# Patient Record
Sex: MALE | Race: BLACK OR AFRICAN AMERICAN | NOT HISPANIC OR LATINO | URBAN - METROPOLITAN AREA
[De-identification: names, ages, dates, MRNs, and addresses within clinical notes are randomized per-mention and may not be internally consistent; named-entity substitution may affect disease eponyms.]

---

## 2018-01-24 ENCOUNTER — EMERGENCY (EMERGENCY)
Facility: HOSPITAL | Age: 28
LOS: 1 days | Discharge: HOME | End: 2018-01-24
Attending: EMERGENCY MEDICINE
Payer: COMMERCIAL

## 2018-01-24 VITALS
WEIGHT: 179.9 LBS | DIASTOLIC BLOOD PRESSURE: 92 MMHG | HEART RATE: 86 BPM | RESPIRATION RATE: 16 BRPM | SYSTOLIC BLOOD PRESSURE: 158 MMHG | TEMPERATURE: 98 F | HEIGHT: 74 IN | OXYGEN SATURATION: 100 %

## 2018-01-24 VITALS
RESPIRATION RATE: 16 BRPM | DIASTOLIC BLOOD PRESSURE: 77 MMHG | OXYGEN SATURATION: 100 % | SYSTOLIC BLOOD PRESSURE: 134 MMHG | HEART RATE: 62 BPM

## 2018-01-24 PROCEDURE — 99282 EMERGENCY DEPT VISIT SF MDM: CPT

## 2018-01-24 PROCEDURE — 99284 EMERGENCY DEPT VISIT MOD MDM: CPT

## 2018-01-24 NOTE — ED ADULT TRIAGE NOTE - CHIEF COMPLAINT QUOTE
pt employee lower right groin pain back pain when lifting first felt in October but intermittent but know discomfort consistent x 3 days normal bowel and bladder verbalized

## 2018-01-24 NOTE — ED PROVIDER NOTE - ATTENDING CONTRIBUTION TO CARE
Attending MD Brock:   I personally have seen and examined this patient.  Physician assistant note reviewed and agree on plan of care and except where noted.  See HPI, PE, and MDM for details.

## 2018-01-24 NOTE — ED PROVIDER NOTE - PHYSICAL EXAMINATION
Attending MD Brock: A & O x 3, NAD, EOMI b/l, PERRL b/l; lungs CTAB, heart with reg rhythm without murmur; abdomen soft NTND; extremities with no edema; affect appropriate. neuro exam non focal with no motor or sensory deficits. full AROM b/l hips, no focal bony ttp, no bulges appreciated in right inguinal area, testis nontender b/l

## 2018-01-24 NOTE — ED PROVIDER NOTE - OBJECTIVE STATEMENT
26 yo male with no PMHx p/w right lower groin pain.  The patient reports that he first noticed right groin pain 3-4 months ago.  He endorses that he lifts a lot of trays at work and the pain only occurs while lifting heavy objects.  Since his symptoms started he has had intermittent episodes, but this week he noticed more frequent episodes so he decided to come to the ER.  Patient currently pain free.  Denies symptoms at rest, fevers/chills, CP, SOB, abd pain, NVDC, dysuria, hematuria, testicular pain.  Patient also c/o low back pain after work that usually improves with rest.  No difficulty ambulating.  Denies fecal/urinary incontinence

## 2018-01-24 NOTE — ED ADULT NURSE NOTE - OBJECTIVE STATEMENT
26 yo with no PMH presents to ED with right lower groin pain. 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 This week pain has been more frequent  this week so he decided to come to ED. No pain at rest.

## 2018-01-24 NOTE — ED PROVIDER NOTE - MEDICAL DECISION MAKING DETAILS
Attending MD Brock: 27M with right groin pain x weeks with lifting, ddx includes small inguinal hernia vs SI joint pain. NSAIDs, outpatient sports med and gen surg f/u

## 2018-02-01 PROBLEM — Z00.00 ENCOUNTER FOR PREVENTIVE HEALTH EXAMINATION: Status: ACTIVE | Noted: 2018-02-01

## 2018-02-02 ENCOUNTER — APPOINTMENT (OUTPATIENT)
Dept: ORTHOPEDIC SURGERY | Facility: CLINIC | Age: 28
End: 2018-02-02
Payer: OTHER MISCELLANEOUS

## 2018-02-02 VITALS — HEIGHT: 74 IN | BODY MASS INDEX: 23.1 KG/M2 | WEIGHT: 180 LBS

## 2018-02-02 VITALS — SYSTOLIC BLOOD PRESSURE: 129 MMHG | HEART RATE: 52 BPM | DIASTOLIC BLOOD PRESSURE: 74 MMHG

## 2018-02-02 VITALS — WEIGHT: 180 LBS | BODY MASS INDEX: 23.1 KG/M2 | HEIGHT: 74 IN

## 2018-02-02 PROCEDURE — 72100 X-RAY EXAM L-S SPINE 2/3 VWS: CPT

## 2018-02-02 PROCEDURE — 99204 OFFICE O/P NEW MOD 45 MIN: CPT

## 2018-02-20 ENCOUNTER — APPOINTMENT (OUTPATIENT)
Dept: SURGERY | Facility: CLINIC | Age: 28
End: 2018-02-20
Payer: OTHER MISCELLANEOUS

## 2018-02-20 ENCOUNTER — TRANSCRIPTION ENCOUNTER (OUTPATIENT)
Age: 28
End: 2018-02-20

## 2018-02-20 VITALS
BODY MASS INDEX: 23.1 KG/M2 | OXYGEN SATURATION: 97 % | HEART RATE: 65 BPM | DIASTOLIC BLOOD PRESSURE: 81 MMHG | RESPIRATION RATE: 16 BRPM | SYSTOLIC BLOOD PRESSURE: 132 MMHG | WEIGHT: 180 LBS | TEMPERATURE: 97.9 F | HEIGHT: 74 IN

## 2018-02-20 DIAGNOSIS — Z78.9 OTHER SPECIFIED HEALTH STATUS: ICD-10-CM

## 2018-02-20 DIAGNOSIS — Z82.49 FAMILY HISTORY OF ISCHEMIC HEART DISEASE AND OTHER DISEASES OF THE CIRCULATORY SYSTEM: ICD-10-CM

## 2018-02-20 DIAGNOSIS — Z80.9 FAMILY HISTORY OF MALIGNANT NEOPLASM, UNSPECIFIED: ICD-10-CM

## 2018-02-20 PROCEDURE — 99203 OFFICE O/P NEW LOW 30 MIN: CPT

## 2018-02-23 ENCOUNTER — APPOINTMENT (OUTPATIENT)
Dept: ORTHOPEDIC SURGERY | Facility: CLINIC | Age: 28
End: 2018-02-23
Payer: OTHER MISCELLANEOUS

## 2018-02-23 VITALS
WEIGHT: 180 LBS | BODY MASS INDEX: 23.1 KG/M2 | HEIGHT: 74 IN | DIASTOLIC BLOOD PRESSURE: 83 MMHG | SYSTOLIC BLOOD PRESSURE: 137 MMHG | HEART RATE: 81 BPM

## 2018-02-23 DIAGNOSIS — Q76.3 CONGENITAL SCOLIOSIS DUE TO CONGENITAL BONY MALFORMATION: ICD-10-CM

## 2018-02-23 DIAGNOSIS — M54.5 LOW BACK PAIN: ICD-10-CM

## 2018-02-23 DIAGNOSIS — G89.29 LOW BACK PAIN: ICD-10-CM

## 2018-02-23 PROCEDURE — 99214 OFFICE O/P EST MOD 30 MIN: CPT

## 2018-02-25 ENCOUNTER — FORM ENCOUNTER (OUTPATIENT)
Age: 28
End: 2018-02-25

## 2018-02-26 ENCOUNTER — APPOINTMENT (OUTPATIENT)
Dept: ULTRASOUND IMAGING | Facility: IMAGING CENTER | Age: 28
End: 2018-02-26
Payer: OTHER MISCELLANEOUS

## 2018-02-26 ENCOUNTER — OUTPATIENT (OUTPATIENT)
Dept: OUTPATIENT SERVICES | Facility: HOSPITAL | Age: 28
LOS: 1 days | End: 2018-02-26
Payer: COMMERCIAL

## 2018-02-26 DIAGNOSIS — R10.31 RIGHT LOWER QUADRANT PAIN: ICD-10-CM

## 2018-02-26 PROCEDURE — 76857 US EXAM PELVIC LIMITED: CPT

## 2018-02-26 PROCEDURE — 76857 US EXAM PELVIC LIMITED: CPT | Mod: 26

## 2018-03-23 ENCOUNTER — OUTPATIENT (OUTPATIENT)
Dept: OUTPATIENT SERVICES | Facility: HOSPITAL | Age: 28
LOS: 1 days | End: 2018-03-23
Payer: COMMERCIAL

## 2018-03-23 ENCOUNTER — OTHER (OUTPATIENT)
Age: 28
End: 2018-03-23

## 2018-03-23 VITALS
RESPIRATION RATE: 18 BRPM | DIASTOLIC BLOOD PRESSURE: 82 MMHG | HEART RATE: 69 BPM | HEIGHT: 74.75 IN | TEMPERATURE: 98 F | SYSTOLIC BLOOD PRESSURE: 134 MMHG | WEIGHT: 184.09 LBS | OXYGEN SATURATION: 100 %

## 2018-03-23 DIAGNOSIS — Z01.818 ENCOUNTER FOR OTHER PREPROCEDURAL EXAMINATION: ICD-10-CM

## 2018-03-23 DIAGNOSIS — Z98.890 OTHER SPECIFIED POSTPROCEDURAL STATES: Chronic | ICD-10-CM

## 2018-03-23 DIAGNOSIS — K40.90 UNILATERAL INGUINAL HERNIA, WITHOUT OBSTRUCTION OR GANGRENE, NOT SPECIFIED AS RECURRENT: ICD-10-CM

## 2018-03-23 DIAGNOSIS — K40.20 BILATERAL INGUINAL HERNIA, WITHOUT OBSTRUCTION OR GANGRENE, NOT SPECIFIED AS RECURRENT: ICD-10-CM

## 2018-03-23 PROCEDURE — G0463: CPT

## 2018-03-23 RX ORDER — LIDOCAINE HCL 20 MG/ML
0.2 VIAL (ML) INJECTION ONCE
Qty: 0 | Refills: 0 | Status: DISCONTINUED | OUTPATIENT
Start: 2018-04-03 | End: 2018-04-18

## 2018-03-23 RX ORDER — SODIUM CHLORIDE 9 MG/ML
3 INJECTION INTRAMUSCULAR; INTRAVENOUS; SUBCUTANEOUS EVERY 8 HOURS
Qty: 0 | Refills: 0 | Status: DISCONTINUED | OUTPATIENT
Start: 2018-04-03 | End: 2018-04-18

## 2018-03-23 NOTE — H&P PST ADULT - NSANTHOSAYNRD_GEN_A_CORE
No. FRIDA screening performed.  STOP BANG Legend: 0-2 = LOW Risk; 3-4 = INTERMEDIATE Risk; 5-8 = HIGH Risk

## 2018-03-23 NOTE — H&P PST ADULT - HISTORY OF PRESENT ILLNESS
28 y/o M PMH bilateral inguinal hernias since December, went to the ED in January with sharp right groin pain.  Presents today for laparoscopic bilateral inguinal hernia repair.

## 2018-03-26 RX ORDER — CEFAZOLIN SODIUM 1 G
2000 VIAL (EA) INJECTION ONCE
Qty: 0 | Refills: 0 | Status: DISCONTINUED | OUTPATIENT
Start: 2018-04-03 | End: 2018-04-18

## 2018-03-28 ENCOUNTER — OTHER (OUTPATIENT)
Age: 28
End: 2018-03-28

## 2018-04-02 ENCOUNTER — TRANSCRIPTION ENCOUNTER (OUTPATIENT)
Age: 28
End: 2018-04-02

## 2018-04-02 RX ORDER — OXYCODONE HYDROCHLORIDE 5 MG/1
5 TABLET ORAL ONCE
Qty: 0 | Refills: 0 | Status: DISCONTINUED | OUTPATIENT
Start: 2018-04-03 | End: 2018-04-03

## 2018-04-02 RX ORDER — HYDROMORPHONE HYDROCHLORIDE 2 MG/ML
0.25 INJECTION INTRAMUSCULAR; INTRAVENOUS; SUBCUTANEOUS
Qty: 0 | Refills: 0 | Status: DISCONTINUED | OUTPATIENT
Start: 2018-04-03 | End: 2018-04-03

## 2018-04-02 RX ORDER — ONDANSETRON 8 MG/1
4 TABLET, FILM COATED ORAL ONCE
Qty: 0 | Refills: 0 | Status: DISCONTINUED | OUTPATIENT
Start: 2018-04-03 | End: 2018-04-18

## 2018-04-02 RX ORDER — SODIUM CHLORIDE 9 MG/ML
1000 INJECTION, SOLUTION INTRAVENOUS
Qty: 0 | Refills: 0 | Status: DISCONTINUED | OUTPATIENT
Start: 2018-04-03 | End: 2018-04-18

## 2018-04-03 ENCOUNTER — APPOINTMENT (OUTPATIENT)
Dept: SURGERY | Facility: HOSPITAL | Age: 28
End: 2018-04-03
Payer: OTHER MISCELLANEOUS

## 2018-04-03 ENCOUNTER — OUTPATIENT (OUTPATIENT)
Dept: OUTPATIENT SERVICES | Facility: HOSPITAL | Age: 28
LOS: 1 days | End: 2018-04-03
Payer: COMMERCIAL

## 2018-04-03 VITALS
TEMPERATURE: 99 F | SYSTOLIC BLOOD PRESSURE: 140 MMHG | RESPIRATION RATE: 15 BRPM | HEART RATE: 80 BPM | DIASTOLIC BLOOD PRESSURE: 80 MMHG | OXYGEN SATURATION: 99 %

## 2018-04-03 VITALS
SYSTOLIC BLOOD PRESSURE: 156 MMHG | RESPIRATION RATE: 16 BRPM | HEART RATE: 93 BPM | TEMPERATURE: 98 F | HEIGHT: 74.75 IN | OXYGEN SATURATION: 97 % | DIASTOLIC BLOOD PRESSURE: 78 MMHG | WEIGHT: 184.09 LBS

## 2018-04-03 DIAGNOSIS — Z98.890 OTHER SPECIFIED POSTPROCEDURAL STATES: Chronic | ICD-10-CM

## 2018-04-03 DIAGNOSIS — K40.20 BILATERAL INGUINAL HERNIA, WITHOUT OBSTRUCTION OR GANGRENE, NOT SPECIFIED AS RECURRENT: ICD-10-CM

## 2018-04-03 DIAGNOSIS — Z01.818 ENCOUNTER FOR OTHER PREPROCEDURAL EXAMINATION: ICD-10-CM

## 2018-04-03 PROCEDURE — C1781: CPT

## 2018-04-03 PROCEDURE — 49650 LAP ING HERNIA REPAIR INIT: CPT | Mod: 82,50

## 2018-04-03 PROCEDURE — 49650 LAP ING HERNIA REPAIR INIT: CPT | Mod: 50

## 2018-04-03 RX ORDER — CELECOXIB 200 MG/1
200 CAPSULE ORAL ONCE
Qty: 0 | Refills: 0 | Status: COMPLETED | OUTPATIENT
Start: 2018-04-03 | End: 2018-04-03

## 2018-04-03 RX ORDER — FAMOTIDINE 10 MG/ML
0 INJECTION INTRAVENOUS
Qty: 0 | Refills: 0 | COMMUNITY

## 2018-04-03 RX ORDER — ACETAMINOPHEN 500 MG
1000 TABLET ORAL ONCE
Qty: 0 | Refills: 0 | Status: COMPLETED | OUTPATIENT
Start: 2018-04-03 | End: 2018-04-03

## 2018-04-03 RX ADMIN — CELECOXIB 200 MILLIGRAM(S): 200 CAPSULE ORAL at 08:55

## 2018-04-03 RX ADMIN — Medication 1000 MILLIGRAM(S): at 08:55

## 2018-04-03 RX ADMIN — CELECOXIB 200 MILLIGRAM(S): 200 CAPSULE ORAL at 13:45

## 2018-04-03 NOTE — ASU DISCHARGE PLAN (ADULT/PEDIATRIC). - NOTIFY
Bleeding that does not stop/Increased Irritability or Sluggishness/Swelling that continues/Fever greater than 101/Inability to Tolerate Liquids or Foods/Pain not relieved by Medications/Unable to Urinate/Persistent Nausea and Vomiting

## 2018-04-03 NOTE — BRIEF OPERATIVE NOTE - PROCEDURE
<<-----Click on this checkbox to enter Procedure Laparoscopic bilateral repair of inguinal hernia, graft or prosthesis  04/03/2018    Active  MIKE

## 2018-04-03 NOTE — BRIEF OPERATIVE NOTE - PRE-OP DX
Non-recurrent bilateral inguinal hernia without obstruction or gangrene  04/03/2018    Active  Ishmael Hernandes

## 2018-04-03 NOTE — ASU DISCHARGE PLAN (ADULT/PEDIATRIC). - MEDICATION SUMMARY - MEDICATIONS TO TAKE
I will START or STAY ON the medications listed below when I get home from the hospital:    no current home medications  -- Indication: For Home med    Pepcid 20 mg oral tablet  -- as per ASU protocol  -- Indication: For Home med

## 2018-04-16 PROBLEM — Z98.890 HISTORY OF BILATERAL INGUINAL HERNIAS: Status: RESOLVED | Noted: 2018-04-03 | Resolved: 2018-04-16

## 2018-04-16 PROBLEM — K40.20 NON-RECURRENT BILATERAL INGUINAL HERNIA WITHOUT OBSTRUCTION OR GANGRENE: Status: RESOLVED | Noted: 2018-03-05 | Resolved: 2018-04-16

## 2018-04-17 ENCOUNTER — APPOINTMENT (OUTPATIENT)
Dept: SURGERY | Facility: CLINIC | Age: 28
End: 2018-04-17
Payer: OTHER MISCELLANEOUS

## 2018-04-17 VITALS
DIASTOLIC BLOOD PRESSURE: 73 MMHG | TEMPERATURE: 97.9 F | HEART RATE: 51 BPM | RESPIRATION RATE: 17 BRPM | SYSTOLIC BLOOD PRESSURE: 176 MMHG | OXYGEN SATURATION: 99 %

## 2018-04-17 DIAGNOSIS — K40.20 BILATERAL INGUINAL HERNIA, W/OUT OBSTRUCTION OR GANGRENE, NOT SPECIFIED AS RECURRENT: ICD-10-CM

## 2018-04-17 DIAGNOSIS — Z98.890 OTHER SPECIFIED POSTPROCEDURAL STATES: ICD-10-CM

## 2018-04-17 PROCEDURE — 99024 POSTOP FOLLOW-UP VISIT: CPT

## 2018-04-17 RX ORDER — IBUPROFEN 800 MG/1
800 TABLET, FILM COATED ORAL
Qty: 90 | Refills: 0 | Status: DISCONTINUED | COMMUNITY
Start: 2018-02-02 | End: 2018-04-17

## 2018-04-17 RX ORDER — OXYCODONE AND ACETAMINOPHEN 5; 325 MG/1; MG/1
5-325 TABLET ORAL
Qty: 24 | Refills: 0 | Status: DISCONTINUED | COMMUNITY
Start: 2018-04-03 | End: 2018-04-17

## 2018-05-08 ENCOUNTER — APPOINTMENT (OUTPATIENT)
Dept: SURGERY | Facility: CLINIC | Age: 28
End: 2018-05-08
Payer: OTHER MISCELLANEOUS

## 2018-05-08 VITALS
DIASTOLIC BLOOD PRESSURE: 95 MMHG | TEMPERATURE: 98.1 F | RESPIRATION RATE: 16 BRPM | WEIGHT: 180 LBS | OXYGEN SATURATION: 99 % | BODY MASS INDEX: 23.1 KG/M2 | HEIGHT: 74 IN | SYSTOLIC BLOOD PRESSURE: 148 MMHG | HEART RATE: 79 BPM

## 2018-05-08 PROCEDURE — 99024 POSTOP FOLLOW-UP VISIT: CPT

## 2018-05-16 ENCOUNTER — EMERGENCY (EMERGENCY)
Facility: HOSPITAL | Age: 28
LOS: 1 days | Discharge: HOME | End: 2018-05-16
Attending: PERSONAL EMERGENCY RESPONSE ATTENDANT | Admitting: PERSONAL EMERGENCY RESPONSE ATTENDANT
Payer: COMMERCIAL

## 2018-05-16 VITALS
SYSTOLIC BLOOD PRESSURE: 148 MMHG | RESPIRATION RATE: 16 BRPM | WEIGHT: 169.98 LBS | HEART RATE: 60 BPM | OXYGEN SATURATION: 98 % | TEMPERATURE: 98 F | DIASTOLIC BLOOD PRESSURE: 77 MMHG

## 2018-05-16 VITALS
HEART RATE: 65 BPM | DIASTOLIC BLOOD PRESSURE: 89 MMHG | SYSTOLIC BLOOD PRESSURE: 125 MMHG | TEMPERATURE: 98 F | OXYGEN SATURATION: 100 % | RESPIRATION RATE: 16 BRPM

## 2018-05-16 DIAGNOSIS — Z98.890 OTHER SPECIFIED POSTPROCEDURAL STATES: Chronic | ICD-10-CM

## 2018-05-16 LAB
ALBUMIN SERPL ELPH-MCNC: 4.7 G/DL — SIGNIFICANT CHANGE UP (ref 3.3–5)
ALP SERPL-CCNC: 48 U/L — SIGNIFICANT CHANGE UP (ref 40–120)
ALT FLD-CCNC: 15 U/L — SIGNIFICANT CHANGE UP (ref 10–45)
ANION GAP SERPL CALC-SCNC: 14 MMOL/L — SIGNIFICANT CHANGE UP (ref 5–17)
AST SERPL-CCNC: 19 U/L — SIGNIFICANT CHANGE UP (ref 10–40)
BASOPHILS # BLD AUTO: 0 K/UL — SIGNIFICANT CHANGE UP (ref 0–0.2)
BASOPHILS NFR BLD AUTO: 0.8 % — SIGNIFICANT CHANGE UP (ref 0–2)
BILIRUB SERPL-MCNC: 0.9 MG/DL — SIGNIFICANT CHANGE UP (ref 0.2–1.2)
BUN SERPL-MCNC: 12 MG/DL — SIGNIFICANT CHANGE UP (ref 7–23)
CALCIUM SERPL-MCNC: 9.9 MG/DL — SIGNIFICANT CHANGE UP (ref 8.4–10.5)
CHLORIDE SERPL-SCNC: 101 MMOL/L — SIGNIFICANT CHANGE UP (ref 96–108)
CO2 SERPL-SCNC: 27 MMOL/L — SIGNIFICANT CHANGE UP (ref 22–31)
CREAT SERPL-MCNC: 1.07 MG/DL — SIGNIFICANT CHANGE UP (ref 0.5–1.3)
EOSINOPHIL # BLD AUTO: 0.2 K/UL — SIGNIFICANT CHANGE UP (ref 0–0.5)
EOSINOPHIL NFR BLD AUTO: 8.3 % — HIGH (ref 0–6)
GLUCOSE SERPL-MCNC: 84 MG/DL — SIGNIFICANT CHANGE UP (ref 70–99)
HCT VFR BLD CALC: 44.5 % — SIGNIFICANT CHANGE UP (ref 39–50)
HGB BLD-MCNC: 14.8 G/DL — SIGNIFICANT CHANGE UP (ref 13–17)
LYMPHOCYTES # BLD AUTO: 1.1 K/UL — SIGNIFICANT CHANGE UP (ref 1–3.3)
LYMPHOCYTES # BLD AUTO: 38.2 % — SIGNIFICANT CHANGE UP (ref 13–44)
MCHC RBC-ENTMCNC: 31.1 PG — SIGNIFICANT CHANGE UP (ref 27–34)
MCHC RBC-ENTMCNC: 33.3 GM/DL — SIGNIFICANT CHANGE UP (ref 32–36)
MCV RBC AUTO: 93.4 FL — SIGNIFICANT CHANGE UP (ref 80–100)
MONOCYTES # BLD AUTO: 0.3 K/UL — SIGNIFICANT CHANGE UP (ref 0–0.9)
MONOCYTES NFR BLD AUTO: 10.2 % — SIGNIFICANT CHANGE UP (ref 2–14)
NEUTROPHILS # BLD AUTO: 1.3 K/UL — LOW (ref 1.8–7.4)
NEUTROPHILS NFR BLD AUTO: 42.5 % — LOW (ref 43–77)
PLATELET # BLD AUTO: 159 K/UL — SIGNIFICANT CHANGE UP (ref 150–400)
POTASSIUM SERPL-MCNC: 4.1 MMOL/L — SIGNIFICANT CHANGE UP (ref 3.5–5.3)
POTASSIUM SERPL-SCNC: 4.1 MMOL/L — SIGNIFICANT CHANGE UP (ref 3.5–5.3)
PROT SERPL-MCNC: 7.5 G/DL — SIGNIFICANT CHANGE UP (ref 6–8.3)
RBC # BLD: 4.76 M/UL — SIGNIFICANT CHANGE UP (ref 4.2–5.8)
RBC # FLD: 11.7 % — SIGNIFICANT CHANGE UP (ref 10.3–14.5)
SODIUM SERPL-SCNC: 142 MMOL/L — SIGNIFICANT CHANGE UP (ref 135–145)
WBC # BLD: 3 K/UL — LOW (ref 3.8–10.5)
WBC # FLD AUTO: 3 K/UL — LOW (ref 3.8–10.5)

## 2018-05-16 PROCEDURE — 74177 CT ABD & PELVIS W/CONTRAST: CPT | Mod: 26

## 2018-05-16 PROCEDURE — 99284 EMERGENCY DEPT VISIT MOD MDM: CPT

## 2018-05-16 PROCEDURE — 74177 CT ABD & PELVIS W/CONTRAST: CPT

## 2018-05-16 PROCEDURE — 80053 COMPREHEN METABOLIC PANEL: CPT

## 2018-05-16 PROCEDURE — 85027 COMPLETE CBC AUTOMATED: CPT

## 2018-05-16 NOTE — ED ADULT NURSE NOTE - OBJECTIVE STATEMENT
28 y/o M A&Ox3 with PMH Bertolotti's syndrome and hernia repair sx (04/03/18) presents to the ED c/o RLQ ABD pain r/t recent inguinal hernia repair. Pt. reports went to work this morning and bent over to lift something and felt a pop sensation today. Pt. reports had inguinal hernia repair surgery last month and pain feels similar to pain had before the repair. Pt. denies N/V, fever, chills, dizziness, dysuria, hematuria, SOB, CP, and diarrhea. Pt. describes pain as a "stretching" feeling, worse with movement - rates pain level a 2/10. Last PO intake was 0630 this morning. Denies taking any pain medication. ABD soft and non distended, RLQ ABD tenderness upon palpation, pos. bowel sounds in all 4 quadrants. Last BM was yesterday - pt. reports it was normal. Reports experienced 3 episodes of diarrhea last week, but has resolved since. Safety and comfort provided. Family at bedside.

## 2018-05-16 NOTE — ED PROVIDER NOTE - PROGRESS NOTE DETAILS
d/w surgery will see pt seen by surgery, requesting CT to evaluate for possible recurrence of hernia Attending MD Lizama.  Surgery originally recommended CT verbally.   Note currently recommending pain control and follow-up with Dr. Hernandes in office.  CT non-actionable.  Stable for discharge, follow-up with Dr. Hernandes.  Rx for pain control. Bellis - pt declines opioids for pain would like to do NSAIDs, f/u with surgery.

## 2018-05-16 NOTE — CONSULT NOTE ADULT - SUBJECTIVE AND OBJECTIVE BOX
General Surgery Consult Note  Pager [phone]    HPI:  27-year-old male s/p laparoscopic bilateral inguinal hernia repair on 4/3/18 presents with acute onset right inguinal pain. Pain occurred when he was lifting trays this morning (patient works in sterile processing for the OR), a couple of hours before presenting to the ED. States that pain is similar to when he was first diagnosed with the hernias. Denies nausea/vomiting. No pain on the left side.   Patient was last seen at the clinic one month ago for his second postop visit and had improving pain at that time.     PAST MEDICAL & SURGICAL HISTORY:  Umbilical hernia: at age 8  Bertolotti's syndrome  S/P hernia repair: umbilical hernia at age 8      ALLERGIES:     fish (Anaphylaxis)  No Known Drug Allergies      HOME MEDICATIONS:  Pepcid 20 mg oral tablet: as per ASU protocol (03 Apr 2018 09:00)      SOCIAL HISTORY:    FAMILY HISTORY:    ___________________________________________  REVIEW OF SYSTEMS:    ___________________________________________  PHYSICAL EXAM:  Vital Signs Last 24 Hrs  T(C): 36.5 (16 May 2018 08:45), Max: 36.5 (16 May 2018 08:45)  T(F): 97.7 (16 May 2018 08:45), Max: 97.7 (16 May 2018 08:45)  HR: 64 (16 May 2018 08:45) (60 - 64)  BP: 124/91 (16 May 2018 08:45) (124/91 - 148/77)  BP(mean): --  RR: 16 (16 May 2018 08:45) (16 - 16)  SpO2: 100% (16 May 2018 08:45) (98% - 100%)CAPILLARY BLOOD GLUCOSE        I&O's Detail      General: A&O x3, NAD  Neuro: CN II-XII intact, motor and sensory grossly intact with no focal deficits.  HEENT: Normocephalic, atraumatic, EOMI, anicteric sclerae.  Respiratory: Clear to auscultation bilaterally, breathing non-labored.  CVS: Regular rate and rhythm  Abdomen: Soft, nondistended, nontender. No rebound, no guarding, No palpable organomegaly or masses.  Extremities: Warm bilaterally with palpable pulses.  MSK: Intact ROM.  ____________________________________________  LABS:                    ____________________________________________  RADIOLOGY: General Surgery Consult Note  Pager [phone]    HPI:  27-year-old male s/p laparoscopic bilateral inguinal hernia repair on 4/3/18 presents with acute onset right inguinal pain. Pain occurred when he was lifting trays this morning (patient works in sterile processing for the OR), a couple of hours before presenting to the ED. States that pain is similar to when he was first diagnosed with the hernias. Denies nausea/vomiting. No pain on the left side.   Patient was last seen at the clinic one month ago for his second postop visit and had improving pain at that time.     PAST MEDICAL & SURGICAL HISTORY:  Umbilical hernia: at age 8  Bertolotti's syndrome  S/P hernia repair: umbilical hernia at age 8      ALLERGIES:     fish (Anaphylaxis)  No Known Drug Allergies      HOME MEDICATIONS:  Pepcid 20 mg oral tablet: as per ASU protocol (03 Apr 2018 09:00)      SOCIAL HISTORY:    FAMILY HISTORY:    ___________________________________________  REVIEW OF SYSTEMS:    ___________________________________________  PHYSICAL EXAM:  Vital Signs Last 24 Hrs  T(C): 36.5 (16 May 2018 08:45), Max: 36.5 (16 May 2018 08:45)  T(F): 97.7 (16 May 2018 08:45), Max: 97.7 (16 May 2018 08:45)  HR: 64 (16 May 2018 08:45) (60 - 64)  BP: 124/91 (16 May 2018 08:45) (124/91 - 148/77)  BP(mean): --  RR: 16 (16 May 2018 08:45) (16 - 16)  SpO2: 100% (16 May 2018 08:45) (98% - 100%)CAPILLARY BLOOD GLUCOSE        General: A&O x3, NAD  Respiratory: Breathing non-labored.  CVS: Regular rate and rhythm  Abdomen: Soft, nondistended. Right groin tenderness. No defect palpated. No bulge on coughing.  Extremities: Warm bilaterally with palpable pulses.  MSK: Intact ROM.  ____________________________________________ General Surgery Consult Note  Pager [phone]    HPI:  27-year-old male s/p laparoscopic bilateral inguinal hernia repair on 4/3/18 presents with acute onset right inguinal pain. Pain occurred when he was lifting trays this morning (patient works in sterile processing for the OR), a couple of hours before presenting to the ED. States that pain is similar to when he was first diagnosed with the hernias. Denies nausea/vomiting. No pain on the left side.   Patient was last seen at the clinic one month ago for his second postop visit and had improving pain at that time.     PAST MEDICAL & SURGICAL HISTORY:  Umbilical hernia: at age 8  Bertolotti's syndrome  S/P hernia repair: umbilical hernia at age 8      ALLERGIES:     fish (Anaphylaxis)  No Known Drug Allergies      HOME MEDICATIONS:  Pepcid 20 mg oral tablet: as per ASU protocol (03 Apr 2018 09:00)      SOCIAL HISTORY: Never smoker    FAMILY HISTORY: No pertinent history in first degree relatives.    ___________________________________________  REVIEW OF SYSTEMS:  Constitutional: No fevers, chills, no recent weight loss  ENMT: No changes in hearing, no changes in vision, no sore throat, no cough  Respiratory: No shortness of breath  Cardiovascular: No chest pain, palpitations  Gastrointestinal: See HPI  Genitourinary: No dysuria, frequency, or urgency    Extremities: No joint swelling, no limited range of movement  Neurological: No paresthesia  Skin: No rashes    ___________________________________________  PHYSICAL EXAM:  Vital Signs Last 24 Hrs  T(C): 36.5 (16 May 2018 08:45), Max: 36.5 (16 May 2018 08:45)  T(F): 97.7 (16 May 2018 08:45), Max: 97.7 (16 May 2018 08:45)  HR: 64 (16 May 2018 08:45) (60 - 64)  BP: 124/91 (16 May 2018 08:45) (124/91 - 148/77)  BP(mean): --  RR: 16 (16 May 2018 08:45) (16 - 16)  SpO2: 100% (16 May 2018 08:45) (98% - 100%)CAPILLARY BLOOD GLUCOSE        General: A&O x3, NAD  Respiratory: Breathing non-labored.  CVS: Regular rate and rhythm  Abdomen: Soft, nondistended. Right groin tenderness. No defect palpated. No bulge on coughing.  Extremities: Warm bilaterally with palpable pulses.  MSK: Intact ROM.  ____________________________________________

## 2018-05-16 NOTE — ED PROVIDER NOTE - ATTENDING CONTRIBUTION TO CARE
Attending MD Lizama.  Agree with above.  Pt is a 27 yr old male with pmhx lower back pain who presents to ED with R inguinal pulling/pressure sensation that began again this morning while at work when he ‘lifted something at work’ for the first time since bilateral inguinal hernia repair by Dr. Ishmael Hernandes on 4/3.  Pt states that he had ‘mesh’ placed over entire suprapubic region.  Had been feeling well until this morning when he began to have discomfort in R inguinal region.  No L inguinal discomfort.  On exam no defect/bulging noted.  No testicular pain/swelling.  No palpable direct/indirect hernia.  On standing/bearing down cannot reproduce defect.  Abdomen otherwise soft, non-distended, no TTP.  No other sxs including no n/v/d/constipation/obstipation/fevers.

## 2018-05-16 NOTE — ED PROVIDER NOTE - OBJECTIVE STATEMENT
27 year old man no PMH p/w inguinal pain. Had b/l inguinal hernia repair a month ago with Greta, was doing well until today when he went back to work, lifting trays and had sudden onset right sided inguinal pain over the op site. Apart from this no GI/ symptoms, fevers.

## 2018-05-16 NOTE — ED ADULT NURSE NOTE - CHPI ED SYMPTOMS NEG
no chills/no blood in stool/no burning urination/no nausea/no fever/no abdominal distension/no hematuria/no diarrhea/no vomiting/no dysuria

## 2018-05-16 NOTE — CONSULT NOTE ADULT - ASSESSMENT
Assessment/Plan: 27y Male history of bilateral inguinal hernia repairs 1.5 months ago presents with right groin pain. Low suspicion of hernia recurrence.     - PO pain control  - Follow up with Dr. Hernandes in the office tomorrow  - Discussed with Dr. Hernandes    Pager [phone]

## 2018-05-22 ENCOUNTER — APPOINTMENT (OUTPATIENT)
Dept: SURGERY | Facility: CLINIC | Age: 28
End: 2018-05-22
Payer: OTHER MISCELLANEOUS

## 2018-05-22 VITALS
RESPIRATION RATE: 16 BRPM | SYSTOLIC BLOOD PRESSURE: 121 MMHG | OXYGEN SATURATION: 100 % | TEMPERATURE: 97.7 F | HEART RATE: 58 BPM | DIASTOLIC BLOOD PRESSURE: 77 MMHG

## 2018-05-22 PROCEDURE — 99024 POSTOP FOLLOW-UP VISIT: CPT

## 2018-05-23 ENCOUNTER — OTHER (OUTPATIENT)
Age: 28
End: 2018-05-23

## 2018-05-25 ENCOUNTER — OTHER (OUTPATIENT)
Age: 28
End: 2018-05-25

## 2018-06-12 ENCOUNTER — APPOINTMENT (OUTPATIENT)
Dept: SURGERY | Facility: CLINIC | Age: 28
End: 2018-06-12
Payer: OTHER MISCELLANEOUS

## 2018-06-12 VITALS
TEMPERATURE: 97.8 F | SYSTOLIC BLOOD PRESSURE: 120 MMHG | OXYGEN SATURATION: 100 % | HEART RATE: 70 BPM | BODY MASS INDEX: 23.1 KG/M2 | WEIGHT: 180 LBS | DIASTOLIC BLOOD PRESSURE: 76 MMHG | RESPIRATION RATE: 16 BRPM | HEIGHT: 74 IN

## 2018-06-12 PROCEDURE — 99024 POSTOP FOLLOW-UP VISIT: CPT

## 2018-07-16 ENCOUNTER — EMERGENCY (EMERGENCY)
Facility: HOSPITAL | Age: 28
LOS: 1 days | Discharge: HOME | End: 2018-07-16
Attending: EMERGENCY MEDICINE
Payer: COMMERCIAL

## 2018-07-16 VITALS
DIASTOLIC BLOOD PRESSURE: 75 MMHG | RESPIRATION RATE: 18 BRPM | HEIGHT: 74 IN | OXYGEN SATURATION: 98 % | TEMPERATURE: 98 F | WEIGHT: 184.97 LBS | HEART RATE: 78 BPM | SYSTOLIC BLOOD PRESSURE: 150 MMHG

## 2018-07-16 VITALS
RESPIRATION RATE: 16 BRPM | SYSTOLIC BLOOD PRESSURE: 130 MMHG | OXYGEN SATURATION: 100 % | TEMPERATURE: 98 F | DIASTOLIC BLOOD PRESSURE: 91 MMHG | HEART RATE: 74 BPM

## 2018-07-16 DIAGNOSIS — Z98.890 OTHER SPECIFIED POSTPROCEDURAL STATES: Chronic | ICD-10-CM

## 2018-07-16 PROCEDURE — 99283 EMERGENCY DEPT VISIT LOW MDM: CPT

## 2018-07-16 PROCEDURE — 99282 EMERGENCY DEPT VISIT SF MDM: CPT

## 2018-07-16 NOTE — CONSULT NOTE ADULT - SUBJECTIVE AND OBJECTIVE BOX
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 Hx significant for bilateral laparoscopic inguinal hernia repair 3 months ago with Dr. Hernandes. Pain started about 3hr ago, getting better. The patient was seen in the ED about 2mo ago for the pain at the surgical site, with normal CT, labs, and exam. Pt was put on light duty/ no heavy lifting at work until today. Pt denies nausea, vomiting, fever, chills, change in bowel or bladder habits, chest pain, shortness of breath.    PMHx: Umbilical hernia  Bertolotti's syndrome  No pertinent past medical history    PSHx: S/P hernia repair  No significant past surgical history    Allergies: fish (Anaphylaxis)  No Known Drug Allergies  (Intolerances: )  Social Hx: non-smoker, occasional alcohol    Physical Exam  T(C): 36.9  HR: 74 (74 - 78)  BP: 130/91 (130/91 - 150/75)  RR: 16 (16 - 18)  SpO2: 100% (98% - 100%)  Tmax: T(C): , Max: 36.9 (07-16-18 @ 12:00)    General: well developed, well nourished, NAD  Neuro: alert and oriented, no focal deficits, moves all extremities spontaneously  HEENT: NCAT, EOMI, anicteric, mucosa moist  Respiratory: airway patent, respirations unlabored  CVS: regular rate and rhythm  Abdomen: soft, nontender, nondistended, well-healed umbilical and laparoscopic incision sites, mildly tender to palpation at inguinal ligaments bilaterally  Groin: no palpable hernia or bulge appreciated on exam w/ coughing  Extremities: no edema, sensation and movement grossly intact  Skin: warm, dry, appropriate color

## 2018-07-16 NOTE — ED PROVIDER NOTE - OBJECTIVE STATEMENT
28M presenting with pain in bl inguinal sites and post op site since this morning with pushing a cart (125lb). Pain started about 3hr ago, getting better. Pt has b/l inguinal hernia repair about 3 mo ago by Dr. Hernandes. Pt was seen in the ED about 2mo ago for the pain at the surgical site, with CT showing normal. Pt was put on light duty/ no heavy lifting at work ([employer]) until today. Today, pt is back to his 100% work duty and this pain started as he was pushing a heavy cart on wheel. Per patient, his doctor has cleared him to work 100% work duty about 2 months ago but his  thought that he was not ready for 100% work duty until today. Otherwise, pt denies nausea, vomiting, fever or chills. Seeing Dr. Mehta in two days for second opinion. 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 Pain started about 3hr ago, getting better. Pt has b/l inguinal hernia repair about 3 mo ago by Dr. Hernandes. Pt was seen in the ED about 2mo ago for the pain at the surgical site, with CT showing normal. Pt was put on light duty/ no heavy lifting at work until today. Today, pt is back to his 100% work duty and this pain started as he was pushing a heavy cart on wheel. Per patient, his doctor has cleared him to work 100% work duty about 2 months ago but his  thought that he was not ready for 100% work duty until today. Otherwise, pt denies nausea, vomiting, fever or chills. Pt believes that resuming 100% duty is too soon and would like to be checked out. Seeing Dr. Mehta in two days for second opinion.

## 2018-07-16 NOTE — ED PROVIDER NOTE - MEDICAL DECISION MAKING DETAILS
28M with b/l inguinal hernia repair 3mo ago presenting with b/l inguinal pain since this morning after pushing a 120lb cart. Pain is getting better and denies n/v. Has an appointment with Dr. Mehta in 2 days for 2nd opinion. Will consider CT to r/o recurrent hernia although the suspicion is low. 28M with b/l inguinal hernia repair 3mo ago presenting with b/l inguinal pain since this morning after pushing a 120lb cart. Pain is getting better and denies n/v. Has an appointment with Dr. Mehta in 2 days for 2nd opinion. Will call sx for consults. Will consider CT to r/o recurrent hernia although the suspicion is low. 28M with b/l inguinal hernia repair 3mo ago presenting with b/l inguinal pain since this morning after pushing a 120lb cart. Pain is getting better and denies n/v. Has an appointment with Dr. Mehta in 2 days for 2nd opinion. Will call sx for consults. Will consider CT to r/o recurrent hernia although the suspicion is low.  MD Arsenio,Attending: pt seen. agree with above HPI/ROS/PE. No evedence of acute intraabdominal urgency now--no nausea/vom/ mason in bowel or urine habits/fever or chills. For SGY review and plan per SGY

## 2018-07-16 NOTE — ED PROVIDER NOTE - CARE PLAN
Principal Discharge DX:	S/P hernia repair  Secondary Diagnosis:	Inguinal pain, unspecified laterality

## 2018-07-16 NOTE — ED PROVIDER NOTE - PROGRESS NOTE DETAILS
Pt appears comfortable, seen ambulating. Per sx, f/u with Dr. Hernandes on Thursday, light duty, no more than 20 lbs.

## 2018-07-16 NOTE — CONSULT NOTE ADULT - ASSESSMENT
29 y/o M p/w B/L inguinal pain with heavy lifting s/p B/L laparoscopic inguinal hernia repair 3 months ago, benign exam.  - no surgical intervention indicated at this time  - light duty, no lifting more than 20 lbs, pt to be provided with doctor's note  - follow up with Dr. Hernandes in the office on Thursday, 7/19  - discussed with Dr. Greta Khoury MD PGY2

## 2018-07-16 NOTE — ED PROVIDER NOTE - PHYSICAL EXAMINATION
Normal bowel sound  No guarding or rebound   Sx site appears fine - no skin color change, no swelling.   Inguinal hernia sites - no protrusion or skin change felt (Chaperoned by Tech ) Normal bowel sound  No guarding or rebound   Sx site appears fine - no skin color change, no swelling.   Inguinal hernia sites - no protrusion or skin change felt (Chaperoned by Radha Do)

## 2018-07-16 NOTE — ED ADULT NURSE NOTE - OBJECTIVE STATEMENT
125 28 yr old BM c/o bilat groin pain today " when pushing a heavy cart at work". s/p bilat inguinal hernia repair 4/3/2018 laparoscopic [facility] by Dr Ishmael Hernandes. No N/V, fever, chills, dysuria or constipation. A&Ox4. ambulatory. skin W&D. lips and nailbeds pink. Lungs clear. abd soft. Sore to palp lower abd. Pain worsens when moving around, less when resting.

## 2018-07-18 ENCOUNTER — APPOINTMENT (OUTPATIENT)
Dept: SURGERY | Facility: CLINIC | Age: 28
End: 2018-07-18
Payer: OTHER MISCELLANEOUS

## 2018-07-18 VITALS
HEART RATE: 55 BPM | DIASTOLIC BLOOD PRESSURE: 79 MMHG | BODY MASS INDEX: 23.1 KG/M2 | HEIGHT: 74 IN | SYSTOLIC BLOOD PRESSURE: 121 MMHG | TEMPERATURE: 98.6 F | WEIGHT: 180 LBS

## 2018-07-18 PROCEDURE — 99243 OFF/OP CNSLTJ NEW/EST LOW 30: CPT

## 2018-07-19 PROBLEM — K42.9 UMBILICAL HERNIA WITHOUT OBSTRUCTION OR GANGRENE: Chronic | Status: ACTIVE | Noted: 2018-03-23

## 2018-07-19 PROBLEM — Q76.3 CONGENITAL SCOLIOSIS DUE TO CONGENITAL BONY MALFORMATION: Chronic | Status: ACTIVE | Noted: 2018-03-23

## 2018-07-26 ENCOUNTER — APPOINTMENT (OUTPATIENT)
Dept: SURGERY | Facility: CLINIC | Age: 28
End: 2018-07-26
Payer: OTHER MISCELLANEOUS

## 2018-07-26 VITALS
RESPIRATION RATE: 16 BRPM | BODY MASS INDEX: 23.1 KG/M2 | HEIGHT: 74 IN | TEMPERATURE: 97.7 F | DIASTOLIC BLOOD PRESSURE: 91 MMHG | OXYGEN SATURATION: 100 % | WEIGHT: 180 LBS | HEART RATE: 60 BPM | SYSTOLIC BLOOD PRESSURE: 135 MMHG

## 2018-07-26 PROCEDURE — 99214 OFFICE O/P EST MOD 30 MIN: CPT

## 2018-08-09 ENCOUNTER — APPOINTMENT (OUTPATIENT)
Dept: SURGERY | Facility: CLINIC | Age: 28
End: 2018-08-09
Payer: OTHER MISCELLANEOUS

## 2018-08-09 VITALS
WEIGHT: 180 LBS | DIASTOLIC BLOOD PRESSURE: 74 MMHG | OXYGEN SATURATION: 97 % | HEART RATE: 60 BPM | RESPIRATION RATE: 14 BRPM | BODY MASS INDEX: 23.1 KG/M2 | HEIGHT: 74 IN | SYSTOLIC BLOOD PRESSURE: 121 MMHG | TEMPERATURE: 97.9 F

## 2018-08-09 PROCEDURE — 99213 OFFICE O/P EST LOW 20 MIN: CPT

## 2018-09-20 ENCOUNTER — APPOINTMENT (OUTPATIENT)
Dept: SURGERY | Facility: CLINIC | Age: 28
End: 2018-09-20
Payer: OTHER MISCELLANEOUS

## 2018-09-20 VITALS
WEIGHT: 180 LBS | RESPIRATION RATE: 16 BRPM | DIASTOLIC BLOOD PRESSURE: 76 MMHG | HEIGHT: 74 IN | HEART RATE: 62 BPM | BODY MASS INDEX: 23.1 KG/M2 | SYSTOLIC BLOOD PRESSURE: 131 MMHG | OXYGEN SATURATION: 98 % | TEMPERATURE: 98.1 F

## 2018-09-20 DIAGNOSIS — R10.31 RIGHT LOWER QUADRANT PAIN: ICD-10-CM

## 2018-09-20 PROCEDURE — 99214 OFFICE O/P EST MOD 30 MIN: CPT

## 2018-10-17 ENCOUNTER — APPOINTMENT (OUTPATIENT)
Dept: SURGERY | Facility: CLINIC | Age: 28
End: 2018-10-17
Payer: OTHER MISCELLANEOUS

## 2018-10-17 VITALS
HEART RATE: 59 BPM | DIASTOLIC BLOOD PRESSURE: 69 MMHG | WEIGHT: 184.3 LBS | HEIGHT: 73.5 IN | OXYGEN SATURATION: 99 % | TEMPERATURE: 97.9 F | BODY MASS INDEX: 23.91 KG/M2 | SYSTOLIC BLOOD PRESSURE: 117 MMHG

## 2018-10-17 PROCEDURE — 99243 OFF/OP CNSLTJ NEW/EST LOW 30: CPT

## 2018-12-04 ENCOUNTER — APPOINTMENT (OUTPATIENT)
Dept: SURGERY | Facility: CLINIC | Age: 28
End: 2018-12-04
Payer: OTHER MISCELLANEOUS

## 2018-12-04 VITALS
BODY MASS INDEX: 23.87 KG/M2 | WEIGHT: 184 LBS | HEART RATE: 63 BPM | DIASTOLIC BLOOD PRESSURE: 68 MMHG | HEIGHT: 73.5 IN | TEMPERATURE: 98.2 F | SYSTOLIC BLOOD PRESSURE: 122 MMHG | RESPIRATION RATE: 15 BRPM | OXYGEN SATURATION: 99 %

## 2018-12-04 DIAGNOSIS — R10.30 LOWER ABDOMINAL PAIN, UNSPECIFIED: ICD-10-CM

## 2018-12-04 DIAGNOSIS — R10.32 LEFT LOWER QUADRANT PAIN: ICD-10-CM

## 2018-12-04 PROCEDURE — 99214 OFFICE O/P EST MOD 30 MIN: CPT

## 2018-12-04 RX ORDER — ACETAMINOPHEN 325 MG/1
TABLET, FILM COATED ORAL
Refills: 0 | Status: ACTIVE | COMMUNITY

## 2019-02-19 ENCOUNTER — EMERGENCY (EMERGENCY)
Facility: HOSPITAL | Age: 29
LOS: 1 days | Discharge: HOME | End: 2019-02-19
Attending: EMERGENCY MEDICINE
Payer: COMMERCIAL

## 2019-02-19 VITALS
HEIGHT: 74 IN | RESPIRATION RATE: 18 BRPM | SYSTOLIC BLOOD PRESSURE: 142 MMHG | OXYGEN SATURATION: 100 % | TEMPERATURE: 98 F | WEIGHT: 179.9 LBS | HEART RATE: 85 BPM | DIASTOLIC BLOOD PRESSURE: 89 MMHG

## 2019-02-19 VITALS
HEART RATE: 87 BPM | OXYGEN SATURATION: 99 % | SYSTOLIC BLOOD PRESSURE: 136 MMHG | RESPIRATION RATE: 18 BRPM | TEMPERATURE: 98 F

## 2019-02-19 DIAGNOSIS — Z98.890 OTHER SPECIFIED POSTPROCEDURAL STATES: Chronic | ICD-10-CM

## 2019-02-19 LAB
ALBUMIN SERPL ELPH-MCNC: 4.7 G/DL — SIGNIFICANT CHANGE UP (ref 3.3–5)
ALP SERPL-CCNC: 49 U/L — SIGNIFICANT CHANGE UP (ref 40–120)
ALT FLD-CCNC: 17 U/L — SIGNIFICANT CHANGE UP (ref 10–45)
ANION GAP SERPL CALC-SCNC: 14 MMOL/L — SIGNIFICANT CHANGE UP (ref 5–17)
APTT BLD: 31.2 SEC — SIGNIFICANT CHANGE UP (ref 27.5–36.3)
AST SERPL-CCNC: 21 U/L — SIGNIFICANT CHANGE UP (ref 10–40)
BASOPHILS # BLD AUTO: 0.1 K/UL — SIGNIFICANT CHANGE UP (ref 0–0.2)
BASOPHILS NFR BLD AUTO: 0.8 % — SIGNIFICANT CHANGE UP (ref 0–2)
BILIRUB SERPL-MCNC: 0.7 MG/DL — SIGNIFICANT CHANGE UP (ref 0.2–1.2)
BUN SERPL-MCNC: 7 MG/DL — SIGNIFICANT CHANGE UP (ref 7–23)
CALCIUM SERPL-MCNC: 10.2 MG/DL — SIGNIFICANT CHANGE UP (ref 8.4–10.5)
CHLORIDE SERPL-SCNC: 100 MMOL/L — SIGNIFICANT CHANGE UP (ref 96–108)
CO2 SERPL-SCNC: 27 MMOL/L — SIGNIFICANT CHANGE UP (ref 22–31)
CREAT SERPL-MCNC: 1.01 MG/DL — SIGNIFICANT CHANGE UP (ref 0.5–1.3)
EOSINOPHIL # BLD AUTO: 0.1 K/UL — SIGNIFICANT CHANGE UP (ref 0–0.5)
EOSINOPHIL NFR BLD AUTO: 2.3 % — SIGNIFICANT CHANGE UP (ref 0–6)
GLUCOSE SERPL-MCNC: 92 MG/DL — SIGNIFICANT CHANGE UP (ref 70–99)
HCT VFR BLD CALC: 42.2 % — SIGNIFICANT CHANGE UP (ref 39–50)
HGB BLD-MCNC: 14.3 G/DL — SIGNIFICANT CHANGE UP (ref 13–17)
HIV 1 & 2 AB SERPL IA.RAPID: SIGNIFICANT CHANGE UP
INR BLD: 1.1 RATIO — SIGNIFICANT CHANGE UP (ref 0.88–1.16)
LYMPHOCYTES # BLD AUTO: 1.4 K/UL — SIGNIFICANT CHANGE UP (ref 1–3.3)
LYMPHOCYTES # BLD AUTO: 22.2 % — SIGNIFICANT CHANGE UP (ref 13–44)
MCHC RBC-ENTMCNC: 30.5 PG — SIGNIFICANT CHANGE UP (ref 27–34)
MCHC RBC-ENTMCNC: 33.8 GM/DL — SIGNIFICANT CHANGE UP (ref 32–36)
MCV RBC AUTO: 90.2 FL — SIGNIFICANT CHANGE UP (ref 80–100)
MONOCYTES # BLD AUTO: 0.3 K/UL — SIGNIFICANT CHANGE UP (ref 0–0.9)
MONOCYTES NFR BLD AUTO: 4.9 % — SIGNIFICANT CHANGE UP (ref 2–14)
NEUTROPHILS # BLD AUTO: 4.4 K/UL — SIGNIFICANT CHANGE UP (ref 1.8–7.4)
NEUTROPHILS NFR BLD AUTO: 69.7 % — SIGNIFICANT CHANGE UP (ref 43–77)
PLATELET # BLD AUTO: 209 K/UL — SIGNIFICANT CHANGE UP (ref 150–400)
POTASSIUM SERPL-MCNC: 4.1 MMOL/L — SIGNIFICANT CHANGE UP (ref 3.5–5.3)
POTASSIUM SERPL-SCNC: 4.1 MMOL/L — SIGNIFICANT CHANGE UP (ref 3.5–5.3)
PROT SERPL-MCNC: 7.6 G/DL — SIGNIFICANT CHANGE UP (ref 6–8.3)
PROTHROM AB SERPL-ACNC: 12.6 SEC — SIGNIFICANT CHANGE UP (ref 10–12.9)
RBC # BLD: 4.68 M/UL — SIGNIFICANT CHANGE UP (ref 4.2–5.8)
RBC # FLD: 12 % — SIGNIFICANT CHANGE UP (ref 10.3–14.5)
SODIUM SERPL-SCNC: 141 MMOL/L — SIGNIFICANT CHANGE UP (ref 135–145)
WBC # BLD: 6.3 K/UL — SIGNIFICANT CHANGE UP (ref 3.8–10.5)
WBC # FLD AUTO: 6.3 K/UL — SIGNIFICANT CHANGE UP (ref 3.8–10.5)

## 2019-02-19 PROCEDURE — 80053 COMPREHEN METABOLIC PANEL: CPT

## 2019-02-19 PROCEDURE — 85730 THROMBOPLASTIN TIME PARTIAL: CPT

## 2019-02-19 PROCEDURE — 85027 COMPLETE CBC AUTOMATED: CPT

## 2019-02-19 PROCEDURE — 99283 EMERGENCY DEPT VISIT LOW MDM: CPT

## 2019-02-19 PROCEDURE — 85610 PROTHROMBIN TIME: CPT

## 2019-02-19 PROCEDURE — 99284 EMERGENCY DEPT VISIT MOD MDM: CPT

## 2019-02-19 PROCEDURE — 86703 HIV-1/HIV-2 1 RESULT ANTBDY: CPT

## 2019-02-19 RX ORDER — ACETAMINOPHEN 500 MG
975 TABLET ORAL ONCE
Qty: 0 | Refills: 0 | Status: COMPLETED | OUTPATIENT
Start: 2019-02-19 | End: 2019-02-19

## 2019-02-19 RX ORDER — IBUPROFEN 200 MG
600 TABLET ORAL ONCE
Qty: 0 | Refills: 0 | Status: COMPLETED | OUTPATIENT
Start: 2019-02-19 | End: 2019-02-19

## 2019-02-19 RX ADMIN — Medication 975 MILLIGRAM(S): at 15:37

## 2019-02-19 RX ADMIN — Medication 600 MILLIGRAM(S): at 15:37

## 2019-02-19 NOTE — ED ADULT TRIAGE NOTE - BP NONINVASIVE SYSTOLIC (MM HG)
Detail Level: Simple Include Location In Plan?: No Detail Level: Generalized Detail Level: Detailed 142

## 2019-02-19 NOTE — ED PROVIDER NOTE - NSFOLLOWUPINSTRUCTIONS_ED_ALL_ED_FT
FOLLOW UP WITH PMD & PAIN MANAGEMENT  & SURGEON DR VENEGAS WITHIN 1-2DAYS, CALL TO MAKE APPOINTMENT  COME BACK TO ED IF YOUR CONDITION WORSENS OR IF YOU DEVELOP FEVER GREATER THAN 100.4F, CHEST PAIN,  SHORTNESS OF BREATH OR ANY OTHER SYMPTOMS CONCERNING TO YOU  TAKE TYLENOL (ACETAMINOPHEN) 650 MG EVERY 6 HOURS AS NEEDED FOR PAIN  TAKE IBUPROFEN (MOTRIN)  600 MG EVERY 6 HOURS AS NEEDED FOR PAIN

## 2019-02-19 NOTE — ED PROVIDER NOTE - NS ED ROS FT
Review of Systems:  	•	CONSTITUTIONAL: no fever  	•	SKIN: no rash  	•	RESPIRATORY: no shortness of breath  	•	CARDIAC: no chest pain, no palpitations  	•	GI:  no abd pain, no nausea, no vomiting, no diarrhea  	•	GENITO-URINARY:  b/l groin pain  	•	MUSCULOSKELETAL:  no back pain  	•	NEUROLOGIC: no weakness  	•	ALLERGY: no rhinitis  	•	PSYSCHIATRIC: no anxiety

## 2019-02-19 NOTE — ED ADULT NURSE NOTE - OBJECTIVE STATEMENT
pt had bilateral hernia repain last april.  he has had pain ever since  he is scheduled for "pain shots" next week.  he was at work and moved a cart and had increase inpain.  his boss told him to come to the ER

## 2019-02-19 NOTE — ED PROVIDER NOTE - PROGRESS NOTE DETAILS
dayron: PT seen and reassessed.  Patient symptomatically improved.   AAOX3, NAD, VSS.  Discussed test results w/ patient. Patient verbalized understanding of hospital course and outpatient plans, has decisional making capacity.  Will f/u w/ pmd in the next few days; patient will call for an appointment. Will return to the ED if there is any worsening of symptoms.  Patient able to ambulate at baseline, is tolerating PO intake dayron: requesting pre-procedural labs for outpt pain mgmt w/u

## 2019-02-19 NOTE — ED PROVIDER NOTE - OBJECTIVE STATEMENT
Pertinent PMH/PSH/FHx/SHx and Review of Systems contained within  28yoM PMH b/l inguinal hernia repair 4/2018 with chronic groin pain p/w CC b/l groin pain, acute on chronic, intermittent. pain is not different than usual. has relief w/ tramadol. had MRI about 2 mo ago & showed mesh is in place. told he may have nerve damage at inguinal site. is scheduled for local lidocaine injection with pain management on march 5th.   pain makes it hard to do heavy lifting or pushing at work. since pt couldn't do heavy pushing at work, told by manager to be evaluated in ed.  ROS negative for: fever, Chest pain, SOB, Nausea, vomiting, diarrhea, abdominal pain, dysuria, testicle pain, penile d/c  FamilyHx: parents have HTN   and SocialHx not otherwise contributory

## 2019-02-19 NOTE — ED PROVIDER NOTE - PHYSICAL EXAMINATION
*GEN: NAD; well appearing; A+O x3   *HEAD: NC/AT   *EYES/NOSE: PERRL & EOMI b/l  *THROAT: airway patent, moist mucus membranes  *NECK: Neck supple, no masses  *PULMONARY: CTA b/l, symmetric breath sounds.   *CARDIAC: s1s2, regular rhythm, no Murmur  *ABDOMEN:  ND, NT, soft, no guarding, no rebound, no masses   *BACK: no CVA tenderness, Normal  spine   *EXTREMITIES: symmetric pulses, 2+ dp & radial pulses, capillary refill < 2 seconds, no cyanosis, no edema   *SKIN: no rash or bruising   *NEUROLOGIC: alert, CN 2-12 intact, moves all 4 extremeties, full active & passive ROM in all extremeties, normal baseline gait  *PSYCH: insight and judgment nl, memory nl, affect nl, thought nl

## 2019-02-19 NOTE — ED PROVIDER NOTE - CARE PROVIDER_API CALL
Rosa Garcia)  Anesthesiology; Pain Medicine  [address] [place] [address]  Phone: [phone]  Fax: [phone]  Follow Up Time:

## 2019-02-19 NOTE — ED ADULT TRIAGE NOTE - CHIEF COMPLAINT QUOTE
bilateral groin pain, is s/p bilateral inguinal hernia repair 4/18, states has nerve damage fro mesh insertion,

## 2019-02-19 NOTE — ED PROVIDER NOTE - CLINICAL SUMMARY MEDICAL DECISION MAKING FREE TEXT BOX
b/l groin pain, acute on chronic, intermittent. pain is not different than usual. since pt couldn't do heavy pushing at work, told by manager to be evaluated in ed.

## 2019-03-01 ENCOUNTER — TRANSCRIPTION ENCOUNTER (OUTPATIENT)
Age: 29
End: 2019-03-01

## 2020-04-20 ENCOUNTER — APPOINTMENT (OUTPATIENT)
Dept: SURGERY | Facility: CLINIC | Age: 30
End: 2020-04-20
Payer: OTHER MISCELLANEOUS

## 2020-04-20 VITALS
HEIGHT: 74 IN | SYSTOLIC BLOOD PRESSURE: 138 MMHG | TEMPERATURE: 98.1 F | HEART RATE: 69 BPM | BODY MASS INDEX: 21.82 KG/M2 | WEIGHT: 170 LBS | DIASTOLIC BLOOD PRESSURE: 74 MMHG

## 2020-04-20 PROCEDURE — 99203 OFFICE O/P NEW LOW 30 MIN: CPT

## 2020-04-20 NOTE — REVIEW OF SYSTEMS
[Fever] : no fever [Chills] : no chills [Feeling Poorly] : not feeling poorly [Feeling Tired] : not feeling tired [Recent Weight Gain (___ Lbs)] : no recent weight gain [Eye Pain] : no eye pain [Earache] : no earache [Nosebleeds] : no nosebleeds [Chest Pain] : no chest pain [Shortness Of Breath] : no shortness of breath [Wheezing] : no wheezing [Confused] : no confusion [Cough] : no cough [Anxiety] : no anxiety [Easy Bleeding] : no tendency for easy bleeding [Swollen Glands] : no swollen glands [FreeTextEntry1] : bilateral inguinal pain with activity

## 2020-04-20 NOTE — HISTORY OF PRESENT ILLNESS
[de-identified] : Had bilateral laparoscopic  inguinal hernia repair in 2018 and has bilateral inguinal pain since then.\par Has h/o Bertolotti's syndrome \par The pain is located in the inguinal areas especially with movements.\par Is on gabapentin and also has used several local creams \par Has seen pain management for lidocaine injections\par No pain radiating down the legs

## 2020-04-20 NOTE — PHYSICAL EXAM
[JVD] : no jugular venous distention  [Wheezing] : wheezing was heard [Abdominal Masses] : No abdominal masses [Normal Rate and Rhythm] : normal rate and rhythm [Abdomen Tenderness] : ~T ~M No abdominal tenderness [Tender] : was nontender [Oriented to Person] : oriented to person [Purpura] : no purpura  [Alert] : alert [Oriented to Place] : oriented to place [Oriented to Time] : oriented to time [Anxious] : anxious [de-identified] : WD, WN [de-identified] : supple [de-identified] : s [de-identified] : bilateral inguinal tenderness on deep palpation with healed abdominal scars from laparoscopic repair of pipo inguinal hernias

## 2020-04-26 ENCOUNTER — MESSAGE (OUTPATIENT)
Age: 30
End: 2020-04-26

## 2020-04-28 NOTE — ASU PATIENT PROFILE, ADULT - DOES PATIENT HAVE ADVANCE DIRECTIVE
Encounter Summary
  Created on: 2020
 
 Caverly, Paul Duane
 External Reference #: [id number]
 : 73
 Sex: Male
 
 Demographics
 
 
+-----------------------+-----------------------------+
| Address               | [address]                  |
|                       | [facility] [place] [place]  [address] |
+-----------------------+-----------------------------+
| Home Phone            | [phone]             |
+-----------------------+-----------------------------+
| Preferred Language    | Unknown                     |
+-----------------------+-----------------------------+
| Marital Status        | Single                      |
+-----------------------+-----------------------------+
| Bahai Affiliation | Unknown                     |
+-----------------------+-----------------------------+
| Race                  | Unknown                     |
+-----------------------+-----------------------------+
| Ethnic Group          | Unknown                     |
+-----------------------+-----------------------------+
 
 
 Author
 
 
+--------------+--------------------------------------------+
| Author       | [facility]  |
|              | [facility] [place]                                |
+--------------+--------------------------------------------+
| Organization | [facility]  |
|              | [facility] [place]                                |
+--------------+--------------------------------------------+
| Address      | Unknown                                    |
+--------------+--------------------------------------------+
| Phone        | Unavailable                                |
+--------------+--------------------------------------------+
 
 
 
 Support
 
 
+---------+--------------+---------+-----------------+
| Name    | Relationship | Address | Phone           |
+---------+--------------+---------+-----------------+
| Name No | ECON         | Unknown | [phone] |
+---------+--------------+---------+-----------------+
 
 
 
 Care Team Providers
 
 
 
+-----------------------+------+-------------+
| Care Team Member Name | Role | Phone       |
+-----------------------+------+-------------+
 PCP  | Unavailable |
+-----------------------+------+-------------+
 
 
 
 Encounter Details
 
 
+--------+-----------+----------------------+--------------------+-------------+
| Date   | Type      | Department           | Care Team          | Description |
+--------+-----------+----------------------+--------------------+-------------+
| / | [facility]  |   [facility] |   [facility]   |             |
|    | Encounter |  [facility] XRAY  [address]    |             |
|        |           |  [address]       | [address]    |             |
|        |           | [place] [address] | [place] [address]    |             |
|        |           |   [phone]       | [phone]       |             |
|        |           |                      | [phone] (Fax) |             |
+--------+-----------+----------------------+--------------------+-------------+
 
 
 
 Social History
 
 
+----------------+-------+-----------+--------+------+
| Tobacco Use    | Types | Packs/Day | Years  | Date |
|                |       |           | Used   |      |
+----------------+-------+-----------+--------+------+
| Never Assessed |       |           |        |      |
+----------------+-------+-----------+--------+------+
 
 
 
+------------------+---------------+
| Sex Assigned at  | Date Recorded |
| Birth            |               |
+------------------+---------------+
| Not on file      |               |
+------------------+---------------+
 
 
 
+----------------+-------------+-------------+
| Job Start Date | Occupation  | Industry    |
+----------------+-------------+-------------+
| Not on file    | Not on file | Not on file |
+----------------+-------------+-------------+
 
 
 
+----------------+--------------+------------+
| Travel History | Travel Start | Travel End |
+----------------+--------------+------------+
 
 
 
 
+-------------------------------------+
| No recent travel history available. |
+-------------------------------------+
 documented as of this encounter
 
 Plan of Treatment
 Not on filedocumented as of this encounter
 
 Visit Diagnoses
 Not on filedocumented in this encounter No

## 2020-07-20 ENCOUNTER — APPOINTMENT (OUTPATIENT)
Dept: MRI IMAGING | Facility: HOSPITAL | Age: 30
End: 2020-07-20
Payer: OTHER MISCELLANEOUS

## 2020-07-20 ENCOUNTER — OUTPATIENT (OUTPATIENT)
Dept: OUTPATIENT SERVICES | Facility: HOSPITAL | Age: 30
LOS: 1 days | End: 2020-07-20
Payer: COMMERCIAL

## 2020-07-20 DIAGNOSIS — R10.31 RIGHT LOWER QUADRANT PAIN: ICD-10-CM

## 2020-07-20 DIAGNOSIS — Z98.890 OTHER SPECIFIED POSTPROCEDURAL STATES: Chronic | ICD-10-CM

## 2020-07-20 PROCEDURE — 72197 MRI PELVIS W/O & W/DYE: CPT

## 2020-07-20 PROCEDURE — 72197 MRI PELVIS W/O & W/DYE: CPT | Mod: 26

## 2020-09-15 ENCOUNTER — NON-APPOINTMENT (OUTPATIENT)
Age: 30
End: 2020-09-15

## 2022-01-10 NOTE — ED PROVIDER NOTE - ATTENDING CONTRIBUTION TO CARE
Thank you for choosing [facility]. Please bring a current list of medications to every appointment. Please contact your pharmacy for any prescription refill(s) that you are requesting.
I performed a history and physical exam of the patient and discussed their management with the resident. I reviewed the resident's note and agree with the documented findings and plan of care.  Daily Mattson MD
